# Patient Record
Sex: MALE | Race: WHITE | NOT HISPANIC OR LATINO | URBAN - METROPOLITAN AREA
[De-identification: names, ages, dates, MRNs, and addresses within clinical notes are randomized per-mention and may not be internally consistent; named-entity substitution may affect disease eponyms.]

---

## 2024-09-15 ENCOUNTER — APPOINTMENT (OUTPATIENT)
Dept: RADIOLOGY | Facility: MEDICAL CENTER | Age: 69
End: 2024-09-15
Attending: STUDENT IN AN ORGANIZED HEALTH CARE EDUCATION/TRAINING PROGRAM
Payer: OTHER MISCELLANEOUS

## 2024-09-15 ENCOUNTER — HOSPITAL ENCOUNTER (EMERGENCY)
Facility: MEDICAL CENTER | Age: 69
End: 2024-09-15
Attending: STUDENT IN AN ORGANIZED HEALTH CARE EDUCATION/TRAINING PROGRAM
Payer: OTHER MISCELLANEOUS

## 2024-09-15 VITALS
TEMPERATURE: 98.2 F | RESPIRATION RATE: 20 BRPM | WEIGHT: 250 LBS | DIASTOLIC BLOOD PRESSURE: 70 MMHG | SYSTOLIC BLOOD PRESSURE: 121 MMHG | HEART RATE: 82 BPM | BODY MASS INDEX: 37.03 KG/M2 | HEIGHT: 69 IN | OXYGEN SATURATION: 91 %

## 2024-09-15 DIAGNOSIS — S09.90XA CLOSED HEAD INJURY, INITIAL ENCOUNTER: ICD-10-CM

## 2024-09-15 DIAGNOSIS — F10.920 ALCOHOLIC INTOXICATION WITHOUT COMPLICATION (HCC): ICD-10-CM

## 2024-09-15 DIAGNOSIS — S00.01XA ABRASION OF SCALP, INITIAL ENCOUNTER: ICD-10-CM

## 2024-09-15 DIAGNOSIS — T07.XXXA MULTIPLE CONTUSIONS: ICD-10-CM

## 2024-09-15 LAB
ABO + RH BLD: NORMAL
ABO GROUP BLD: NORMAL
ALBUMIN SERPL BCP-MCNC: 4.3 G/DL (ref 3.2–4.9)
ALBUMIN/GLOB SERPL: 1.7 G/DL
ALP SERPL-CCNC: 93 U/L (ref 30–99)
ALT SERPL-CCNC: 24 U/L (ref 2–50)
ANION GAP SERPL CALC-SCNC: 18 MMOL/L (ref 7–16)
APTT PPP: 33 SEC (ref 24.7–36)
AST SERPL-CCNC: 25 U/L (ref 12–45)
BILIRUB SERPL-MCNC: 0.2 MG/DL (ref 0.1–1.5)
BLD GP AB SCN SERPL QL: NORMAL
BUN SERPL-MCNC: 16 MG/DL (ref 8–22)
CALCIUM ALBUM COR SERPL-MCNC: 8.5 MG/DL (ref 8.5–10.5)
CALCIUM SERPL-MCNC: 8.7 MG/DL (ref 8.5–10.5)
CHLORIDE SERPL-SCNC: 96 MMOL/L (ref 96–112)
CO2 SERPL-SCNC: 20 MMOL/L (ref 20–33)
CREAT SERPL-MCNC: 1.05 MG/DL (ref 0.5–1.4)
ERYTHROCYTE [DISTWIDTH] IN BLOOD BY AUTOMATED COUNT: 42.5 FL (ref 35.9–50)
ETHANOL BLD-MCNC: 261.1 MG/DL
GFR SERPLBLD CREATININE-BSD FMLA CKD-EPI: 76 ML/MIN/1.73 M 2
GLOBULIN SER CALC-MCNC: 2.5 G/DL (ref 1.9–3.5)
GLUCOSE SERPL-MCNC: 120 MG/DL (ref 65–99)
HCT VFR BLD AUTO: 44.8 % (ref 42–52)
HGB BLD-MCNC: 15.4 G/DL (ref 14–18)
INR PPP: 0.98 (ref 0.87–1.13)
MCH RBC QN AUTO: 30.9 PG (ref 27–33)
MCHC RBC AUTO-ENTMCNC: 34.4 G/DL (ref 32.3–36.5)
MCV RBC AUTO: 89.8 FL (ref 81.4–97.8)
PLATELET # BLD AUTO: 294 K/UL (ref 164–446)
PMV BLD AUTO: 10.9 FL (ref 9–12.9)
POTASSIUM SERPL-SCNC: 3.3 MMOL/L (ref 3.6–5.5)
PROT SERPL-MCNC: 6.8 G/DL (ref 6–8.2)
PROTHROMBIN TIME: 13.1 SEC (ref 12–14.6)
RBC # BLD AUTO: 4.99 M/UL (ref 4.7–6.1)
RH BLD: NORMAL
SODIUM SERPL-SCNC: 134 MMOL/L (ref 135–145)
WBC # BLD AUTO: 9.3 K/UL (ref 4.8–10.8)

## 2024-09-15 PROCEDURE — 90715 TDAP VACCINE 7 YRS/> IM: CPT

## 2024-09-15 PROCEDURE — 99284 EMERGENCY DEPT VISIT MOD MDM: CPT

## 2024-09-15 PROCEDURE — 85027 COMPLETE CBC AUTOMATED: CPT

## 2024-09-15 PROCEDURE — 82077 ASSAY SPEC XCP UR&BREATH IA: CPT

## 2024-09-15 PROCEDURE — A9270 NON-COVERED ITEM OR SERVICE: HCPCS | Performed by: STUDENT IN AN ORGANIZED HEALTH CARE EDUCATION/TRAINING PROGRAM

## 2024-09-15 PROCEDURE — 700102 HCHG RX REV CODE 250 W/ 637 OVERRIDE(OP): Performed by: STUDENT IN AN ORGANIZED HEALTH CARE EDUCATION/TRAINING PROGRAM

## 2024-09-15 PROCEDURE — 70450 CT HEAD/BRAIN W/O DYE: CPT

## 2024-09-15 PROCEDURE — 305948 HCHG GREEN TRAUMA ACT PRE-NOTIFY NO CC

## 2024-09-15 PROCEDURE — 700111 HCHG RX REV CODE 636 W/ 250 OVERRIDE (IP)

## 2024-09-15 PROCEDURE — 71045 X-RAY EXAM CHEST 1 VIEW: CPT

## 2024-09-15 PROCEDURE — 80053 COMPREHEN METABOLIC PANEL: CPT

## 2024-09-15 PROCEDURE — 72125 CT NECK SPINE W/O DYE: CPT

## 2024-09-15 PROCEDURE — 90471 IMMUNIZATION ADMIN: CPT

## 2024-09-15 PROCEDURE — 85610 PROTHROMBIN TIME: CPT

## 2024-09-15 PROCEDURE — 36415 COLL VENOUS BLD VENIPUNCTURE: CPT

## 2024-09-15 PROCEDURE — 72170 X-RAY EXAM OF PELVIS: CPT

## 2024-09-15 PROCEDURE — 73030 X-RAY EXAM OF SHOULDER: CPT | Mod: LT

## 2024-09-15 PROCEDURE — 86900 BLOOD TYPING SEROLOGIC ABO: CPT | Mod: 91

## 2024-09-15 PROCEDURE — 86901 BLOOD TYPING SEROLOGIC RH(D): CPT

## 2024-09-15 PROCEDURE — 85730 THROMBOPLASTIN TIME PARTIAL: CPT

## 2024-09-15 PROCEDURE — 86850 RBC ANTIBODY SCREEN: CPT

## 2024-09-15 RX ORDER — METHOCARBAMOL 750 MG/1
1500 TABLET, FILM COATED ORAL 3 TIMES DAILY
Qty: 20 TABLET | Refills: 0 | Status: SHIPPED | OUTPATIENT
Start: 2024-09-15 | End: 2024-09-15

## 2024-09-15 RX ORDER — METHOCARBAMOL 750 MG/1
1500 TABLET, FILM COATED ORAL 3 TIMES DAILY
Qty: 20 TABLET | Refills: 0 | Status: SHIPPED | OUTPATIENT
Start: 2024-09-15

## 2024-09-15 RX ORDER — ACETAMINOPHEN 500 MG
1000 TABLET ORAL ONCE
Status: COMPLETED | OUTPATIENT
Start: 2024-09-15 | End: 2024-09-15

## 2024-09-15 RX ADMIN — ACETAMINOPHEN 1000 MG: 500 TABLET ORAL at 23:02

## 2024-09-15 RX ADMIN — CLOSTRIDIUM TETANI TOXOID ANTIGEN (FORMALDEHYDE INACTIVATED), CORYNEBACTERIUM DIPHTHERIAE TOXOID ANTIGEN (FORMALDEHYDE INACTIVATED), BORDETELLA PERTUSSIS TOXOID ANTIGEN (GLUTARALDEHYDE INACTIVATED), BORDETELLA PERTUSSIS FILAMENTOUS HEMAGGLUTININ ANTIGEN (FORMALDEHYDE INACTIVATED), BORDETELLA PERTUSSIS PERTACTIN ANTIGEN, AND BORDETELLA PERTUSSIS FIMBRIAE 2/3 ANTIGEN 0.5 ML: 5; 2; 2.5; 5; 3; 5 INJECTION, SUSPENSION INTRAMUSCULAR at 20:46

## 2024-09-16 NOTE — ED PROVIDER NOTES
"ED Provider Note    CHIEF COMPLAINT  Chief Complaint   Patient presents with    Trauma Green     Fall down escalator     HPI/ROS  LIMITATION TO HISTORY   Select: Intoxication  OUTSIDE HISTORIAN(S):  EMS transported the patient after a fall down an escalator    Tavo Polk-Three is a 69 y.o. male who presents with head trauma after falling down an escalator down multiple stairs.  Unknown LOC.  Patient denies neck or back pain.  Patient was drinking.  Patient denies any other injury.  Patient has an abrasion to the scalp.    PAST MEDICAL HISTORY       SURGICAL HISTORY  patient denies any surgical history    FAMILY HISTORY  History reviewed. No pertinent family history.    SOCIAL HISTORY  Social History     Tobacco Use    Smoking status: Never    Smokeless tobacco: Never   Vaping Use    Vaping status: Never Used   Substance and Sexual Activity    Alcohol use: Yes    Drug use: Never    Sexual activity: Not on file       CURRENT MEDICATIONS  Home Medications       Reviewed by Naomy Ace R.N. (Registered Nurse) on 09/15/24 at 2015  Med List Status: <None>     Medication Last Dose Status        Patient Meek Taking any Medications                         Audit from Redirected Encounters    **Home medications have not yet been reviewed for this encounter**         ALLERGIES  No Known Allergies    PHYSICAL EXAM  VITAL SIGNS: BP (!) 146/65   Pulse 78   Temp 36.6 °C (97.8 °F)   Resp (!) 21   Ht 1.753 m (5' 9\")   Wt 113 kg (250 lb)   SpO2 92%   BMI 36.92 kg/m²      Primary Survey:  Airway is intact, breath sounds equal bilaterally, pulses 2+ upper extremity lower extremity, GCS - 15  Patient fully exposed and gowned in trauma bay.    Secondary Survey:  Constitutional: Alert and oriented x 3, slurred speech  HENT: Scalp abrasion and hematoma to left occiput  Eyes: Pupils equal and reactive, normal conjunctiva  Neck: Supple, normal range of motion, no stridor  Cardiovascular: Extremities are warm and well " perfused, no murmur appreciated, normal cardiac auscultation  Pulmonary: No respiratory distress, normal work of breathing, no accessory muscule usage, breath sounds clear and equal bilaterally  Abdomen: Soft, non-distended, non-tender to palpation, no peritoneal signs  Skin: Warm, dry, no rashes or lesions  Musculoskeletal: Normal range of motion in all extremities, no swelling or deformity noted, no C/T/L spine midline TTP, step-offs or deformities  Neurologic: Alert, oriented, normal speech, normal motor function    RADIOLOGY/PROCEDURES   I have independently interpreted the diagnostic imaging associated with this visit and am waiting the final reading from the radiologist.   My preliminary interpretation is as follows: No intracranial hemorrhage    Radiologist interpretation:  DX-SHOULDER 2+ LEFT   Final Result      No radiographic evidence of acute traumatic injury.      CT-CSPINE WITHOUT PLUS RECONS   Final Result      No acute abnormality identified.      CT-HEAD W/O   Final Result      No acute intracranial abnormality.            DX-PELVIS-1 OR 2 VIEWS   Final Result      No displaced fracture visualized      DX-CHEST-LIMITED (1 VIEW)   Final Result      No acute cardiac or pulmonary abnormalities are identified. Lung volumes are very low.          COURSE & MEDICAL DECISION MAKING    ASSESSMENT, COURSE AND PLAN  Care Narrative: CT head to evaluate for intracranial hemorrhage or skull fracture.  Chest x-ray to evaluate for pleural effusion, pneumothorax.  Cervical spine CT to evaluate for cervical spine fracture, c-collar in place.  Pelvic x-ray to evaluate for fracture.  Tdap will be updated.  Wound will be irrigated.    Electronically signed by: Erasto Tong M.D., 9/15/2024 8:16 PM    No intracranial hemorrhage or skull fracture.  No rib fracture, pneumothorax, no pelvic fracture.  Patient with acute alcohol intoxication, mild hypokalemia, mild elevation anion gap.  Patient able to ambulate,  tolerating oral intake would like to go home at this time in the care of his wife to think is appropriate.  Tdap was updated.  Tylenol for pain control.  Patient's abrasion to his scalp has been dressed and wrapped.    Repeat physical exam benign.  I doubt any serious emergency process at this time.  Patient and/or family, friends given strict return precautions for worsening symptoms and care instructions. They have demonstrated understanding of discharge instructions through teach back mechanism. Advised PCP follow-up in 1-2 days.  Patient/family/friend expresses understanding and agrees to plan.    This dictation has been created using voice recognition software. I am continuously working with the software to minimize the number of voice recognition errors and I have made every attempt to manually correct the errors within my dictation. However errors  related to this voice recognition software may still exist and should be interpreted within the appropriate context.     Electronically signed by: Erasto Tong M.D., 9/15/2024 10:55 PM    DISPOSITION AND DISCUSSIONS  Escalation of care considered, and ultimately not performed:acute inpatient care management, however at this time, the patient is most appropriate for outpatient management    Barriers to care at this time, including but not limited to: Patient does not have established PCP.     Decision tools and prescription drugs considered including, but not limited to: Pain Medications   over-the-counter pain medications are appropriate, narcotics not indicated at this time  .    FINAL DIAGNOSIS  1. Closed head injury, initial encounter    2. Multiple contusions    3. Abrasion of scalp, initial encounter    4. Alcoholic intoxication without complication (HCC)         Electronically signed by: Erasto Tong M.D., 9/15/2024 8:13 PM

## 2024-09-16 NOTE — ED NOTES
Pt is BIB [facility] # 2 from the [facility]. Pt was on an escalator and made it a few steps down but then fell the rest of the way about 22 more steps (maybe 10 feet). No LOC. No thinners. + ETOH. C-collar in place. Pt has abrasion and hematoma to the left posterior head. Also abrasions to his RUE. He is unsure if he is up to date on tetanus.     Pt arrives alert and oriented x 4, moving all extremities.

## 2024-09-16 NOTE — ED NOTES
The pt is alert and oriented. The pt reports shoulder pain. ERP notified. Vitals stable. C-collar in place

## 2024-09-16 NOTE — ED TRIAGE NOTES
Vitals:    09/15/24 2013   BP: (!) 148/83   Pulse: 81   Resp: 16   Temp:    SpO2: 92%     Chief Complaint   Patient presents with    Trauma Green     Fall down escalator     Pt is BIB [facility] from the [facility]. Pt was on an escalator and made it a few steps down but then fell the rest of the way about 22 more steps (maybe 10 feet). No LOC. No thinners. + ETOH. C-collar in place. Pt has abrasion and hematoma to the left posterior head. Also abrasions to his RUE. He is unsure if he is up to date on tetanus.     Pt arrives alert and oriented x 4, moving all extremities.

## 2024-09-16 NOTE — ED NOTES
The pt is alert and oriented. The pt ambulated with a steady gait. The pt reports pain, and is requesting tylenol. ERP notified. Vitals stable. Gauze wrap dressing on the pt's head

## 2024-09-16 NOTE — ED NOTES
"Pt discharged home with spouse. The pt is alert and oriented, calm and cooperative, talks with clear coherent speech, ambulates with a steady gait, moves extremities to dress self. No indicators of pain. Dressing in place on head, all other wounds clean, dry, with no evidence of bleeding. Vitals stable on the monitor. IV discontinued and gauze placed, pt in possession of belongings. Pt provided discharge education and information pertaining to medications and follow up appointments. Pt received copy of discharge instructions and verbalized understanding. /70   Pulse 82   Temp 36.8 °C (98.2 °F) (Temporal)   Resp 20   Ht 1.753 m (5' 9\")   Wt 113 kg (250 lb)   SpO2 91%   BMI 36.92 kg/m²     "

## 2024-09-19 LAB — COMPONENT CELLULAR 8504CLL: NORMAL
